# Patient Record
Sex: FEMALE | Race: WHITE | URBAN - METROPOLITAN AREA
[De-identification: names, ages, dates, MRNs, and addresses within clinical notes are randomized per-mention and may not be internally consistent; named-entity substitution may affect disease eponyms.]

---

## 2024-01-19 ENCOUNTER — TELEMEDICINE (OUTPATIENT)
Dept: NEUROLOGY | Facility: HOSPITAL | Age: 39
End: 2024-01-19
Payer: COMMERCIAL

## 2024-01-19 DIAGNOSIS — G43.009 MIGRAINE WITHOUT AURA AND WITHOUT STATUS MIGRAINOSUS, NOT INTRACTABLE: Primary | ICD-10-CM

## 2024-01-19 PROCEDURE — 99204 OFFICE O/P NEW MOD 45 MIN: CPT | Performed by: PSYCHIATRY & NEUROLOGY

## 2024-01-19 PROCEDURE — 99214 OFFICE O/P EST MOD 30 MIN: CPT | Mod: 95 | Performed by: PSYCHIATRY & NEUROLOGY

## 2024-01-19 RX ORDER — SUMATRIPTAN SUCCINATE 100 MG/1
TABLET ORAL
Qty: 9 TABLET | Refills: 11 | Status: SHIPPED | OUTPATIENT
Start: 2024-01-19

## 2024-01-19 NOTE — PROGRESS NOTES
Subjective     Chief Complaint: Headache    Beverly Vega is a 38 y.o. year old who presents with chief complaint of headaches.    HPI    Beverly started getting headaches at age 20. Generally, headaches last about 4-6 hours in duration. Patient has 3/30 headache days per month on average. She had a very strong headache in December that felt worse than her usual headache and lasted for 8 hours. She took two sumatriptan and it didn't help with headache, it was accompanied by nausea dn vomiting. The headaches are usually sharp and are located behind the left eye, generally unilateral involving left side of the head. The patient rates her most severe headaches a 8 in intensity. Associated nausea but no vomiting except the one in December. Headaches are worsened with alcohol.    Beverly does not experience headache aura.    Work attendance or other daily activities are affected by the headaches.    Current Acute Headache Treatment Sumatriptan   Current Preventative Headache Treatment None   Previous Acute Headache Treatment  None   Previous Preventative Headache Treatment None       ROS: As per HPI, otherwise all other systems have been reviewed are negative for complaint.     Review of Systems    Past Medical History:   Diagnosis Date    Abnormal cytological findings in specimens from other organs, systems and tissues     Pap smear, abnormal    Complete or unspecified spontaneous  without complication         Personal history of other infectious and parasitic diseases     History of sexually transmitted disease     Past Surgical History:   Procedure Laterality Date    OTHER SURGICAL HISTORY  2021    Nose surgery     No family history on file.  Social history:  No tobacco use, 2 glasses of wine a week, no illicit drug use.       Objective   There were no vitals taken for this visit.    Neuro Exam:  Limited exam due to virtual visit  General: Nontoxic appearing in no acute distress  Psychiatry:  Flattened affect with brightening. Mood-congruent affect. No psychomotor retardation/agitation.     Neurological Exam: Awake, alert. Attention intact. Speech fluent without dysphasia. Gaze conjugate. No adventitious movements appreciated.   Neurological Exam  Physical Exam    Assessment/Plan     Beverly Vega is a pleasant 38 year old woman with no pertinent PMHx who presents with chief complaint of headaches. She has     Given the frequency and description of headaches, Beverly likely has migraine without aura   Per our discussion, we will continue sumatriptan for acute treatment of migraine.      PLAN:  Continue sumatriptan for acute migraine treatment  I ordered brain MRI w/wo contrast  We discussed medication overuse headache, I recommended using a headache diary  Follow-up in 3 months or earlier if needed.     To prevent medication overuse headache:  · Take your headache medication as prescribed.  · Avoid medications that contain butalbital or opioids.  · Use OTC painkillers less than 15 days a month.  · Limit use of triptans or combination analgesics to no more than nine days a month.    General guidelines that can help prevent most headaches:  · Avoid headache triggers: for example dark chocolate, aged cheese and diet fizzy drinks. Keep a headache diary with details about every headache. Eventually, you may see a pattern.  · Get enough sleep. Go to bed and wake up at the same time every day - even on weekends.  · Stay hydrated. Be sure to drink plenty of water or other uncaffeinated fluids.  · Exercise regularly and reduce stress.   ·  Lose weight. Obesity can contribute to headache development, so if you need to lose weight, find a program that works for you.  · Quit smoking. If you smoke, talk to your doctor about quitting. Smoking is linked to a higher risk of medication overuse headache.

## 2025-01-17 ENCOUNTER — HOSPITAL ENCOUNTER (OUTPATIENT)
Dept: RADIOLOGY | Facility: HOSPITAL | Age: 40
End: 2025-01-17
Payer: COMMERCIAL

## 2025-01-19 ENCOUNTER — APPOINTMENT (OUTPATIENT)
Dept: RADIOLOGY | Facility: HOSPITAL | Age: 40
End: 2025-01-19
Payer: COMMERCIAL

## 2025-02-24 DIAGNOSIS — G43.009 MIGRAINE WITHOUT AURA AND WITHOUT STATUS MIGRAINOSUS, NOT INTRACTABLE: ICD-10-CM

## 2025-03-04 RX ORDER — SUMATRIPTAN SUCCINATE 100 MG/1
TABLET ORAL
Qty: 9 TABLET | Refills: 11 | Status: SHIPPED | OUTPATIENT
Start: 2025-03-04